# Patient Record
Sex: FEMALE | Race: WHITE | Employment: FULL TIME | URBAN - METROPOLITAN AREA
[De-identification: names, ages, dates, MRNs, and addresses within clinical notes are randomized per-mention and may not be internally consistent; named-entity substitution may affect disease eponyms.]

---

## 2021-03-19 ENCOUNTER — VIRTUAL VISIT (OUTPATIENT)
Dept: ENDOCRINOLOGY | Age: 26
End: 2021-03-19
Payer: COMMERCIAL

## 2021-03-19 DIAGNOSIS — E28.2 PCOS (POLYCYSTIC OVARIAN SYNDROME): Primary | ICD-10-CM

## 2021-03-19 DIAGNOSIS — L68.0 HIRSUTISM: ICD-10-CM

## 2021-03-19 PROCEDURE — 99204 OFFICE O/P NEW MOD 45 MIN: CPT | Performed by: INTERNAL MEDICINE

## 2021-03-19 RX ORDER — ARIPIPRAZOLE 10 MG/1
TABLET ORAL
COMMUNITY

## 2021-03-19 RX ORDER — METFORMIN HYDROCHLORIDE 500 MG/1
TABLET, EXTENDED RELEASE ORAL
Qty: 120 TAB | Refills: 11 | Status: SHIPPED | OUTPATIENT
Start: 2021-03-19 | End: 2021-09-22

## 2021-03-19 RX ORDER — LEVONORGESTREL 19.5 MG/1
1 INTRAUTERINE DEVICE INTRAUTERINE ONCE
COMMUNITY

## 2021-03-19 NOTE — PROGRESS NOTES
Chief Complaint   Patient presents with    New Patient     PCOS    Other     no pcp and pharmacy confirmed       **THIS IS A VIRTUAL VISIT VIA A VIDEO SYNCHRONOUS DISCUSSION. PATIENT AGREED TO HAVE THEIR CARE DELIVERED OVER A DOXY. ME VIDEO VISIT IN PLACE OF THEIR REGULARLY SCHEDULED OFFICE VISIT**    History of Present Illness: Lino Oliver (Janette) is a 32 y.o. female who is a new patient for evaluation of PCOS referred by Maritza Dunn. Prior to the birth of her son 2.5 years ago, had a rogue hair that she would deal with but since the birth of her son, this has worsened and now has to shave every other day and some on the abdomen. Always had irregular periods since menarche with periods every other month and then went on Nexplanon for about 3 years and didn't have any periods during this time. Then came off this and went back to periods every other month and then became pregnant without trying and had Nexplanon re-inserted and then had an IUD inserted in 8/20 and since then has had periods every month that sometimes can last weeks at a time. Did have an ultrasound in her teens and apparently one ovary wasn't seen well and the other didn't have cysts for sure. No repeat ultrasounds. She saw her GYN, Maritza Dunn, in 1/21 and had labs showing an elevated total testosterone of 73 and free testosterone of 5.3 and her DHEA-S was high at 748 and 17-OH progesterone was normal at 168 and was told she had PCOS and referred for this reason. Weight is currently 178 and was as high as 220 at the time of delivery. Has lost weight since delivery walking a 5K per day on her treadmill but hasn't made any change to her diet and does have room for improvement. Doesn't drink any sweet drinks but does put some milk in her coffee. Did not have gestational DM but did have gestational HTN and did not need any meds was delivered early vaginally by just 2 weeks.       Past Medical History:   Diagnosis Date    Bipolar disorder ([facility] 2015    Gestational hypertension     did not need any meds    SVT (supraventricular tachycardia) ([facility] 2008    s/p ablation     Past Surgical History:   Procedure Laterality Date    HX SVT ABLATION  2008    HX TONSILLECTOMY  2002    HX TYMPANOPLASTY       Current Outpatient Medications   Medication Sig    ARIPiprazole (Abilify) 10 mg tablet Abilify 10 mg tablet   Take 1 tablet every day by oral route.  levonorgestreL (Kyleena) 17.5 mcg/24 hrs (5 yrs) 19.5 mg IUD IUD 1 Each by IntraUTERine route once. Inserted in 8/20     No current facility-administered medications for this visit. No Known Allergies     History reviewed. No pertinent family history.      Social History     Socioeconomic History    Marital status: SINGLE     Spouse name: Not on file    Number of children: Not on file    Years of education: Not on file    Highest education level: Not on file   Occupational History    Not on file   Social Needs    Financial resource strain: Not on file    Food insecurity     Worry: Not on file     Inability: Not on file    Transportation needs     Medical: Not on file     Non-medical: Not on file   Tobacco Use    Smoking status: Former Smoker     Quit date: 2018     Years since quitting: 3.2    Smokeless tobacco: Never Used   Substance and Sexual Activity    Alcohol use: Yes     Comment: every other week may have 2-3 drinks    Drug use: Yes     Comment: marijuana    Sexual activity: Not on file   Lifestyle    Physical activity     Days per week: Not on file     Minutes per session: Not on file    Stress: Not on file   Relationships    Social connections     Talks on phone: Not on file     Gets together: Not on file     Attends Scientologist service: Not on file     Active member of club or organization: Not on file     Attends meetings of clubs or organizations: Not on file     Relationship status: Not on file    Intimate partner violence     Fear of current or ex partner: Not on file     Emotionally abused: Not on file     Physically abused: Not on file     Forced sexual activity: Not on file   Other Topics Concern    Not on file   Social History Narrative    Lives in [address] [place] [address] with her aunt and 35 year old son. In the process of moving to [place]. 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 Likes to read and watch movies, paint, and be on the water. Review of Systems: per HPI    Physical Examination:  - GENERAL: Charo Wilkins well nourished   - EYES: EOMI, non-icteric, no proptosis   - Ear/Nose/Throat: NCAT, no visible inflammation or masses   - CARDIOVASCULAR: no cyanosis, no visible JVD   - RESPIRATORY: respiratory effort normal without any distress or labored breathing   - MUSCULOSKELETAL: Normal ROM of neck and upper extremities observed   - SKIN: No rash on face  - NEUROLOGIC:  No facial asymmetry (Cranial nerve 7 motor function), No gaze palsy   - PSYCHIATRIC: Normal affect, Normal insight and judgement     Data Reviewed:   - labs as above    Assessment/Plan:   1. PCOS (polycystic ovarian syndrome): Prior to the birth of her son 2.5 years ago, had a rogue hair that she would deal with but since the birth of her son, this has worsened and now has to shave every other day and some on the abdomen. Always had irregular periods since menarche with periods every other month and then went on Nexplanon for about 3 years and didn't have any periods during this time. Then came off this and went back to periods every other month and then became pregnant without trying and had Nexplanon re-inserted and then had an IUD inserted in 8/20 and since then has had periods every month that sometimes can last weeks at a time. Did have an ultrasound in her teens and apparently one ovary wasn't seen well and the other didn't have cysts for sure. No repeat ultrasounds.   She saw her GYN, Maritza Dunn, in 1/21 and had labs showing an elevated total testosterone of 73 and free testosterone of 5.3 and her DHEA-S was high at 748 and 17-OH progesterone was normal at 168 and was told she had PCOS and referred for this reason. Discussed the physiology of PCOS and will start metformin at this time. - begin Metformin  mg at dinner and titrate to 2 tabs bid as tolerated   - check total testosterone and DHEA-S and cmp in 3 months and prior to next visit     2. Hirsutism: hopefully metformin will help but may need spironolactone +/- OCP to help      Patient Instructions   1) The testosterone level is elevated. I think you could have a condition called PCOS or polycystic ovarian syndrome where your body is resistant to insulin leading to high testosterone levels and this can cause irregular periods and weight gain and hair growth. It also increases the risk for diabetes and heart disease in the future. We will try to lower your testosterone with metformin to help treat this condition and prevent diabetes. 2) Begin Metformin  mg with your largest meal and take for 1-2 weeks. If no GI side effects (nausea, diarrhea, belly pain), go up to 1000 mg = 2 tabs daily for 1-2 weeks. If still doing well, go up to 3 tabs = 1500 mg for another 1-2 weeks and then finally 4 tabs = 2000 mg daily. You can split the dose to 2 tabs twice daily if you would like. If at any point you cannot tolerate the dose, go back to the highest dose that you can tolerate. This will be ready for  at the pharmacy today. 3) We will follow your testosterone and DHEA-S levels over time to see if they come down with treatment to let us know the metformin is working. 4) We may want to consider switching from the IUD to a birth control pill in the future as this can help with hair growth and regulation of periods. 5) Please come for a follow up visit on 9/22/21 at 11:10am in our [facility] office. The address is [facility] [address] in the [facility] ([address] [facility] ([place], [place])) 6) I put an order directly into the [employer] system to repeat your labs in 3 months and in the 1-2 weeks prior to your next visit so just ask for the order under my name and you will receive a courtesy reminder through [employer] to have these drawn. Follow-up and Dispositions    · Return 9/22/21 at 11:10am.             Copy sent to:  Gypsy Winslow NP (Nurse Practitioner)    Lab follow up: 6/26/21  Component      Latest Ref Rng & Units 6/24/2021 6/24/2021 6/24/2021          11:00 AM 11:00 AM 11:00 AM   Glucose      65 - 99 mg/dL 85     BUN      6 - 20 mg/dL 13     Creatinine      0.57 - 1.00 mg/dL 0.78     GFR est non-AA      >59 mL/min/1.73 105     GFR est AA      >59 mL/min/1.73 121     BUN/Creatinine ratio      9 - 23 17     Sodium      134 - 144 mmol/L 136     Potassium      3.5 - 5.2 mmol/L 4.7     Chloride      96 - 106 mmol/L 101     CO2      20 - 29 mmol/L 21     Calcium      8.7 - 10.2 mg/dL 9.9     Protein, total      6.0 - 8.5 g/dL 7.2     Albumin      3.9 - 5.0 g/dL 4.6     GLOBULIN, TOTAL      1.5 - 4.5 g/dL 2.6     A-G Ratio      1.2 - 2.2 1.8     Bilirubin, total      0.0 - 1.2 mg/dL 1.0     Alk. phosphatase      48 - 121 IU/L 71     AST      0 - 40 IU/L 13     ALT      0 - 32 IU/L 8     Testosterone, Serum (Total)      10.0 - 55.0 ng/dL   22.1   DHEA Sulfate      84.8 - 378.0 ug/dL  693.0 (H)      Sent her the following message through [employer]:  Your total testosterone has come down from 73 to 22 and goal is under 30 so the metformin has worked well to treat your PCOS. Your DHEA-S has only come down from 748 to 693 so this level is still quite elevated. Given you couldn't tolerate a full 4 tabs of metformin per day and had to go down to 3 tabs daily earlier this month, it may be difficult to get this level down to normal without additional treatment. I would recommend we start spironolactone which is a medication to block testosterone to see if this will further treat your PCOS. I sent 50 mg tabs to start at 1 tablet daily in the morning for 2 weeks and then increase to 2 tabs daily. This will be ready for  at the pharmacy today.  -------------------------------------------------------------------------------------------------------------------  BUN and creatinine are markers of kidney function. Your values are normal.  -------------------------------------------------------------------------------------------------------------------  ALT and AST are markers of liver function.   Your values are normal.  -------------------------------------------------------------------------------------------------------------------  Your electrolytes (sodium, potassium, and calcium) are all normal.  Your glucose (blood sugar) is normal.

## 2021-03-19 NOTE — PATIENT INSTRUCTIONS
1) The testosterone level is elevated. I think you could have a condition called PCOS or polycystic ovarian syndrome where your body is resistant to insulin leading to high testosterone levels and this can cause irregular periods and weight gain and hair growth. It also increases the risk for diabetes and heart disease in the future. We will try to lower your testosterone with metformin to help treat this condition and prevent diabetes. 2) Begin Metformin  mg with your largest meal and take for 1-2 weeks. If no GI side effects (nausea, diarrhea, belly pain), go up to 1000 mg = 2 tabs daily for 1-2 weeks. If still doing well, go up to 3 tabs = 1500 mg for another 1-2 weeks and then finally 4 tabs = 2000 mg daily. You can split the dose to 2 tabs twice daily if you would like. If at any point you cannot tolerate the dose, go back to the highest dose that you can tolerate. This will be ready for  at the pharmacy today. 3) We will follow your testosterone and DHEA-S levels over time to see if they come down with treatment to let us know the metformin is working. 4) We may want to consider switching from the IUD to a birth control pill in the future as this can help with hair growth and regulation of periods. 5) Please come for a follow up visit on 9/22/21 at 11:10am in our [address] [place] [address] office. The address is [address] [place] [address] in the [facility] ([address] [facility]) 6) I put an order directly into the [employer] system to repeat your labs in 3 months and in the 1-2 weeks prior to your next visit so just ask for the order under my name and you will receive a courtesy reminder through [employer] to have these drawn.

## 2021-06-19 DIAGNOSIS — L68.0 HIRSUTISM: ICD-10-CM

## 2021-06-19 DIAGNOSIS — E28.2 PCOS (POLYCYSTIC OVARIAN SYNDROME): ICD-10-CM

## 2021-06-25 LAB
ALBUMIN SERPL-MCNC: 4.6 G/DL (ref 3.9–5)
ALBUMIN/GLOB SERPL: 1.8 {RATIO} (ref 1.2–2.2)
ALP SERPL-CCNC: 71 IU/L (ref 48–121)
ALT SERPL-CCNC: 8 IU/L (ref 0–32)
AST SERPL-CCNC: 13 IU/L (ref 0–40)
BILIRUB SERPL-MCNC: 1 MG/DL (ref 0–1.2)
BUN SERPL-MCNC: 13 MG/DL (ref 6–20)
BUN/CREAT SERPL: 17 (ref 9–23)
CALCIUM SERPL-MCNC: 9.9 MG/DL (ref 8.7–10.2)
CHLORIDE SERPL-SCNC: 101 MMOL/L (ref 96–106)
CO2 SERPL-SCNC: 21 MMOL/L (ref 20–29)
CREAT SERPL-MCNC: 0.78 MG/DL (ref 0.57–1)
DHEA-S SERPL-MCNC: 693 UG/DL (ref 84.8–378)
GLOBULIN SER CALC-MCNC: 2.6 G/DL (ref 1.5–4.5)
GLUCOSE SERPL-MCNC: 85 MG/DL (ref 65–99)
POTASSIUM SERPL-SCNC: 4.7 MMOL/L (ref 3.5–5.2)
PROT SERPL-MCNC: 7.2 G/DL (ref 6–8.5)
SODIUM SERPL-SCNC: 136 MMOL/L (ref 134–144)
TESTOST SERPL-MCNC: 22.1 NG/DL (ref 10–55)

## 2021-06-26 RX ORDER — SPIRONOLACTONE 50 MG/1
TABLET, FILM COATED ORAL
Qty: 180 TABLET | Refills: 3 | Status: SHIPPED | OUTPATIENT
Start: 2021-06-26

## 2021-09-08 DIAGNOSIS — E28.2 PCOS (POLYCYSTIC OVARIAN SYNDROME): ICD-10-CM

## 2021-09-08 DIAGNOSIS — L68.0 HIRSUTISM: ICD-10-CM

## 2021-09-22 ENCOUNTER — OFFICE VISIT (OUTPATIENT)
Dept: ENDOCRINOLOGY | Age: 26
End: 2021-09-22
Payer: COMMERCIAL

## 2021-09-22 VITALS
WEIGHT: 164.8 LBS | SYSTOLIC BLOOD PRESSURE: 109 MMHG | DIASTOLIC BLOOD PRESSURE: 69 MMHG | BODY MASS INDEX: 25.87 KG/M2 | HEIGHT: 67 IN | HEART RATE: 75 BPM

## 2021-09-22 DIAGNOSIS — E28.2 PCOS (POLYCYSTIC OVARIAN SYNDROME): Primary | ICD-10-CM

## 2021-09-22 LAB
ALBUMIN SERPL-MCNC: 4.6 G/DL (ref 3.9–5)
ALBUMIN/GLOB SERPL: 2.3 {RATIO} (ref 1.2–2.2)
ALP SERPL-CCNC: 72 IU/L (ref 44–121)
ALT SERPL-CCNC: 9 IU/L (ref 0–32)
AST SERPL-CCNC: 10 IU/L (ref 0–40)
BILIRUB SERPL-MCNC: 0.3 MG/DL (ref 0–1.2)
BUN SERPL-MCNC: 19 MG/DL (ref 6–20)
BUN/CREAT SERPL: 24 (ref 9–23)
CALCIUM SERPL-MCNC: 9.3 MG/DL (ref 8.7–10.2)
CHLORIDE SERPL-SCNC: 104 MMOL/L (ref 96–106)
CO2 SERPL-SCNC: 22 MMOL/L (ref 20–29)
CREAT SERPL-MCNC: 0.78 MG/DL (ref 0.57–1)
DHEA-S SERPL-MCNC: 475 UG/DL (ref 84.8–378)
GLOBULIN SER CALC-MCNC: 2 G/DL (ref 1.5–4.5)
GLUCOSE SERPL-MCNC: 87 MG/DL (ref 65–99)
POTASSIUM SERPL-SCNC: 4.5 MMOL/L (ref 3.5–5.2)
PROT SERPL-MCNC: 6.6 G/DL (ref 6–8.5)
SODIUM SERPL-SCNC: 139 MMOL/L (ref 134–144)
TESTOST SERPL-MCNC: 30.4 NG/DL (ref 10–55)

## 2021-09-22 PROCEDURE — 99214 OFFICE O/P EST MOD 30 MIN: CPT | Performed by: INTERNAL MEDICINE

## 2021-09-22 RX ORDER — METFORMIN HYDROCHLORIDE 500 MG/1
TABLET, EXTENDED RELEASE ORAL
Qty: 120 TABLET | Refills: 11
Start: 2021-09-22

## 2021-09-22 RX ORDER — LAMOTRIGINE 25 MG/1
TABLET ORAL
COMMUNITY
Start: 2021-09-17

## 2021-09-22 RX ORDER — ONDANSETRON 4 MG/1
4 TABLET, ORALLY DISINTEGRATING ORAL
Qty: 30 TABLET | Refills: 11 | Status: SHIPPED | OUTPATIENT
Start: 2021-09-22

## 2021-09-22 NOTE — PROGRESS NOTES
Chief Complaint   Patient presents with    PCOS     no pcp and pharmacy confirmed     History of Present Illness: Mackenzie Baldwin (Janette) is a 32 y.o. female here for follow up of PCOS. Weight down 14 lbs since last visit in 3/21. Has been taking 2 tabs of metformin together in the morning and tried adding a 3rd tab at night a few months ago but had more n/v and stopped this and has just stayed on 2 tabs in the morning. Started kasia in 6/21 and has worked up to 2 tabs together in the morning. Has been doing pilates and calesthenics over the past 5 months and has cut back on sugar and fat. Most of her carbs are from fruit and veggies. Still has IUD in place. Periods are coming each month and had been bleeding for up to 3 weeks at a time prior to starting metformin but has been down to about 10 days per month over the past few months. Has cut back on need for shaving from every other day to about every 2 weeks. Hair has been not as dark but can still feel coarse. Her depression has been a little worse recently and just had lamictal prescribed and will be starting this now. Willing to try and go up on metformin but asked for some zofran to have in case she has n/v so I sent this in for her. Current Outpatient Medications   Medication Sig    lamoTRIgine (LaMICtal) 25 mg tablet Take 1 tab daily x 2 weeks and then increase to 2 tabs daily    metFORMIN ER (GLUCOPHAGE XR) 500 mg tablet Take 2 tabs daily--Dose change 9/22/21--updated med list--did not send prescription to the pharmacy    spironolactone (ALDACTONE) 50 mg tablet Take 1 tab daily for 2 weeks and then increase to 2 tabs daily    ARIPiprazole (Abilify) 10 mg tablet Abilify 10 mg tablet   Take 1 tablet every day by oral route.  levonorgestreL (Kyleena) 17.5 mcg/24 hrs (5 yrs) 19.5 mg IUD IUD 1 Each by IntraUTERine route once. Inserted in 8/20     No current facility-administered medications for this visit.      No Known Allergies     Review of Systems: PER HPI    Physical Examination:  Blood pressure 109/69, pulse 75, height 5' 7\" (1.702 m), weight 164 lb 12.8 oz (74.8 kg). - General: pleasant, no distress, good eye contact   - Neck: small amount of palpable thyroid tissue without nodules, no carotid bruits  - Cardiovascular: regular, normal rate, nl s1 and s2, no m/r/g   - Respiratory: clear to auscultation bilaterally   - Integumentary: skin is normal, no edema  - Neurological: reflexes 2+ at biceps, no tremors  - Psychiatric: normal mood and affect    Data Reviewed:   Component      Latest Ref Rng & Units 9/20/2021 9/20/2021 9/20/2021           8:37 AM  8:37 AM  8:37 AM   Glucose      65 - 99 mg/dL 87     BUN      6 - 20 mg/dL 19     Creatinine      0.57 - 1.00 mg/dL 0.78     GFR est non-AA      >59 mL/min/1.73 105     GFR est AA      >59 mL/min/1.73 121     BUN/Creatinine ratio      9 - 23 24 (H)     Sodium      134 - 144 mmol/L 139     Potassium      3.5 - 5.2 mmol/L 4.5     Chloride      96 - 106 mmol/L 104     CO2      20 - 29 mmol/L 22     Calcium      8.7 - 10.2 mg/dL 9.3     Protein, total      6.0 - 8.5 g/dL 6.6     Albumin      3.9 - 5.0 g/dL 4.6     GLOBULIN, TOTAL      1.5 - 4.5 g/dL 2.0     A-G Ratio      1.2 - 2.2 2.3 (H)     Bilirubin, total      0.0 - 1.2 mg/dL 0.3     Alk. phosphatase      44 - 121 IU/L 72     AST      0 - 40 IU/L 10     ALT      0 - 32 IU/L 9     DHEA Sulfate      84.8 - 378.0 ug/dL   475.0 (H)   Testosterone, Serum (Total)      10.0 - 55.0 ng/dL  30.4        Assessment/Plan:     1. PCOS (polycystic ovarian syndrome): Prior to the birth of her son 2.5 years ago, had a rogue hair that she would deal with but since the birth of her son, this has worsened and now has to shave every other day and some on the abdomen. Always had irregular periods since menarche with periods every other month and then went on Nexplanon for about 3 years and didn't have any periods during this time.   Then came off this and went back to periods every other month and then became pregnant without trying and had Nexplanon re-inserted and then had an IUD inserted in 8/20 and since then has had periods every month that sometimes can last weeks at a time. Did have an ultrasound in her teens and apparently one ovary wasn't seen well and the other didn't have cysts for sure. No repeat ultrasounds. She saw her GYN, Karen Agarwal, in 1/21 and had labs showing an elevated total testosterone of 73 and free testosterone of 5.3 and her DHEA-S was high at 748 and 17-OH progesterone was normal at 168 and was told she had PCOS and referred for this reason. Began metformin at initial visit in 3/21 and worked up to 2 tabs daily and T down to 22 and DHEA-S down to 693 in 6/21 and T 30.4 and DHEA-S 475 in 9/21. Will try to go up to 3 tabs/day. - increase Metformin  mg to 2 tabs in am and 1 tab at dinner as tolerated   - zofran 4 mg as needed for n/v  - check total testosterone and DHEA-S and bmp prior to next visit       2. Hirsutism: metformin and kasia have helped. - increase kasia 50 mg to 3 tabs daily as tolerated       Patient Instructions   1) Your testosterone is 30 and goal is 30 or less and this is down from 73 prior to starting treatment. 2) Start the lamictal first and work up to 2 tabs daily and if stable after 2 weeks on this dose of 2 tab per day, then try adding a 3rd pill of metformin per day by taking it at dinner and stay on 2 tabs in the morning. If needed, I sent some zofran for nausea to your pharmacy. 3) If tolerating 3 tabs of metformin for 2 weeks, then you can try increasing the spironolactone to 3 tabs daily and if tolerating this dose, then let me know and I can rewrite for 100 mg tabs to take 1.5 tabs daily. 4) BUN and creatinine are markers of kidney function. Your values are normal.    5) I hope your DHEA-S level will continue to come down.         Follow-up and Dispositions    · Return in about 6 months (around 3/22/2022).                Copy sent to:  Alma Delia Grant NP (Nurse Practitioner)

## 2021-09-22 NOTE — PATIENT INSTRUCTIONS
1) Your testosterone is 30 and goal is 30 or less and this is down from 73 prior to starting treatment. 2) Start the lamictal first and work up to 2 tabs daily and if stable after 2 weeks on this dose of 2 tab per day, then try adding a 3rd pill of metformin per day by taking it at dinner and stay on 2 tabs in the morning. If needed, I sent some zofran for nausea to your pharmacy. 3) If tolerating 3 tabs of metformin for 2 weeks, then you can try increasing the spironolactone to 3 tabs daily and if tolerating this dose, then let me know and I can rewrite for 100 mg tabs to take 1.5 tabs daily. 4) BUN and creatinine are markers of kidney function. Your values are normal.    5) I hope your DHEA-S level will continue to come down.

## 2021-12-04 ENCOUNTER — APPOINTMENT (OUTPATIENT)
Dept: ULTRASOUND IMAGING | Age: 26
End: 2021-12-04
Attending: EMERGENCY MEDICINE
Payer: COMMERCIAL

## 2021-12-04 ENCOUNTER — APPOINTMENT (OUTPATIENT)
Dept: CT IMAGING | Age: 26
End: 2021-12-04
Attending: EMERGENCY MEDICINE
Payer: COMMERCIAL

## 2021-12-04 ENCOUNTER — HOSPITAL ENCOUNTER (EMERGENCY)
Age: 26
Discharge: HOME | End: 2021-12-04
Attending: EMERGENCY MEDICINE
Payer: COMMERCIAL

## 2021-12-04 VITALS
SYSTOLIC BLOOD PRESSURE: 128 MMHG | HEART RATE: 99 BPM | WEIGHT: 164 LBS | TEMPERATURE: 98 F | BODY MASS INDEX: 25.74 KG/M2 | RESPIRATION RATE: 16 BRPM | DIASTOLIC BLOOD PRESSURE: 76 MMHG | HEIGHT: 67 IN | OXYGEN SATURATION: 99 %

## 2021-12-04 DIAGNOSIS — R10.9 RIGHT FLANK PAIN: Primary | ICD-10-CM

## 2021-12-04 DIAGNOSIS — R31.9 HEMATURIA, UNSPECIFIED TYPE: ICD-10-CM

## 2021-12-04 LAB
ALBUMIN SERPL-MCNC: 3.6 G/DL (ref 3.5–5)
ALBUMIN/GLOB SERPL: 0.8 {RATIO} (ref 1.1–2.2)
ALP SERPL-CCNC: 58 U/L (ref 45–117)
ALT SERPL-CCNC: 37 U/L (ref 12–78)
ANION GAP SERPL CALC-SCNC: 6 MMOL/L (ref 5–15)
APPEARANCE UR: ABNORMAL
AST SERPL-CCNC: 18 U/L (ref 15–37)
BACTERIA URNS QL MICRO: ABNORMAL /HPF
BASOPHILS # BLD: 0 K/UL (ref 0–0.1)
BASOPHILS NFR BLD: 0 % (ref 0–1)
BILIRUB SERPL-MCNC: 0.7 MG/DL (ref 0.2–1)
BILIRUB UR QL CFM: NEGATIVE
BUN SERPL-MCNC: 20 MG/DL (ref 6–20)
BUN/CREAT SERPL: 25 (ref 12–20)
CALCIUM SERPL-MCNC: 9.5 MG/DL (ref 8.5–10.1)
CHLORIDE SERPL-SCNC: 105 MMOL/L (ref 97–108)
CO2 SERPL-SCNC: 23 MMOL/L (ref 21–32)
COLOR UR: ABNORMAL
CREAT SERPL-MCNC: 0.8 MG/DL (ref 0.55–1.02)
DIFFERENTIAL METHOD BLD: ABNORMAL
EOSINOPHIL # BLD: 0.1 K/UL (ref 0–0.4)
EOSINOPHIL NFR BLD: 1 % (ref 0–7)
EPITH CASTS URNS QL MICRO: ABNORMAL /LPF
ERYTHROCYTE [DISTWIDTH] IN BLOOD BY AUTOMATED COUNT: 11.7 % (ref 11.5–14.5)
GLOBULIN SER CALC-MCNC: 4.6 G/DL (ref 2–4)
GLUCOSE SERPL-MCNC: 96 MG/DL (ref 65–100)
GLUCOSE UR STRIP.AUTO-MCNC: NEGATIVE MG/DL
HCG UR QL: NEGATIVE
HCT VFR BLD AUTO: 39 % (ref 35–47)
HGB BLD-MCNC: 13.1 G/DL (ref 11.5–16)
HGB UR QL STRIP: ABNORMAL
IMM GRANULOCYTES # BLD AUTO: 0 K/UL (ref 0–0.04)
IMM GRANULOCYTES NFR BLD AUTO: 0 % (ref 0–0.5)
KETONES UR QL STRIP.AUTO: ABNORMAL MG/DL
LEUKOCYTE ESTERASE UR QL STRIP.AUTO: ABNORMAL
LIPASE SERPL-CCNC: 80 U/L (ref 73–393)
LYMPHOCYTES # BLD: 1.4 K/UL (ref 0.8–3.5)
LYMPHOCYTES NFR BLD: 12 % (ref 12–49)
MCH RBC QN AUTO: 31.4 PG (ref 26–34)
MCHC RBC AUTO-ENTMCNC: 33.6 G/DL (ref 30–36.5)
MCV RBC AUTO: 93.5 FL (ref 80–99)
MONOCYTES # BLD: 0.7 K/UL (ref 0–1)
MONOCYTES NFR BLD: 6 % (ref 5–13)
NEUTS SEG # BLD: 10 K/UL (ref 1.8–8)
NEUTS SEG NFR BLD: 81 % (ref 32–75)
NITRITE UR QL STRIP.AUTO: NEGATIVE
NRBC # BLD: 0 K/UL (ref 0–0.01)
NRBC BLD-RTO: 0 PER 100 WBC
PH UR STRIP: 5.5 [PH] (ref 5–8)
PLATELET # BLD AUTO: 295 K/UL (ref 150–400)
PMV BLD AUTO: 10 FL (ref 8.9–12.9)
POTASSIUM SERPL-SCNC: 3.8 MMOL/L (ref 3.5–5.1)
PROT SERPL-MCNC: 8.2 G/DL (ref 6.4–8.2)
PROT UR STRIP-MCNC: 30 MG/DL
RBC # BLD AUTO: 4.17 M/UL (ref 3.8–5.2)
RBC #/AREA URNS HPF: ABNORMAL /HPF (ref 0–5)
SODIUM SERPL-SCNC: 134 MMOL/L (ref 136–145)
SP GR UR REFRACTOMETRY: 1.02 (ref 1–1.03)
UR CULT HOLD, URHOLD: NORMAL
UROBILINOGEN UR QL STRIP.AUTO: 1 EU/DL (ref 0.2–1)
WBC # BLD AUTO: 12.4 K/UL (ref 3.6–11)
WBC URNS QL MICRO: ABNORMAL /HPF (ref 0–4)

## 2021-12-04 PROCEDURE — 99283 EMERGENCY DEPT VISIT LOW MDM: CPT

## 2021-12-04 PROCEDURE — 81001 URINALYSIS AUTO W/SCOPE: CPT

## 2021-12-04 PROCEDURE — 96375 TX/PRO/DX INJ NEW DRUG ADDON: CPT

## 2021-12-04 PROCEDURE — 74011000250 HC RX REV CODE- 250: Performed by: EMERGENCY MEDICINE

## 2021-12-04 PROCEDURE — 85025 COMPLETE CBC W/AUTO DIFF WBC: CPT

## 2021-12-04 PROCEDURE — 74011000636 HC RX REV CODE- 636: Performed by: RADIOLOGY

## 2021-12-04 PROCEDURE — 81025 URINE PREGNANCY TEST: CPT

## 2021-12-04 PROCEDURE — 74177 CT ABD & PELVIS W/CONTRAST: CPT

## 2021-12-04 PROCEDURE — 96374 THER/PROPH/DIAG INJ IV PUSH: CPT

## 2021-12-04 PROCEDURE — 83690 ASSAY OF LIPASE: CPT

## 2021-12-04 PROCEDURE — 76830 TRANSVAGINAL US NON-OB: CPT

## 2021-12-04 PROCEDURE — 74011250636 HC RX REV CODE- 250/636: Performed by: EMERGENCY MEDICINE

## 2021-12-04 PROCEDURE — 36415 COLL VENOUS BLD VENIPUNCTURE: CPT

## 2021-12-04 PROCEDURE — 80053 COMPREHEN METABOLIC PANEL: CPT

## 2021-12-04 PROCEDURE — 76856 US EXAM PELVIC COMPLETE: CPT

## 2021-12-04 PROCEDURE — 87086 URINE CULTURE/COLONY COUNT: CPT

## 2021-12-04 RX ORDER — FENTANYL CITRATE 50 UG/ML
50 INJECTION, SOLUTION INTRAMUSCULAR; INTRAVENOUS ONCE
Status: COMPLETED | OUTPATIENT
Start: 2021-12-04 | End: 2021-12-04

## 2021-12-04 RX ORDER — ONDANSETRON 2 MG/ML
4 INJECTION INTRAMUSCULAR; INTRAVENOUS ONCE
Status: COMPLETED | OUTPATIENT
Start: 2021-12-04 | End: 2021-12-04

## 2021-12-04 RX ORDER — KETOROLAC TROMETHAMINE 30 MG/ML
INJECTION, SOLUTION INTRAMUSCULAR; INTRAVENOUS
Status: DISCONTINUED
Start: 2021-12-04 | End: 2021-12-04 | Stop reason: HOSPADM

## 2021-12-04 RX ORDER — ONDANSETRON 4 MG/1
4 TABLET, ORALLY DISINTEGRATING ORAL
Qty: 12 TABLET | Refills: 0 | Status: SHIPPED | OUTPATIENT
Start: 2021-12-04

## 2021-12-04 RX ORDER — KETOROLAC TROMETHAMINE 30 MG/ML
30 INJECTION, SOLUTION INTRAMUSCULAR; INTRAVENOUS
Status: COMPLETED | OUTPATIENT
Start: 2021-12-04 | End: 2021-12-04

## 2021-12-04 RX ORDER — CEPHALEXIN 500 MG/1
500 CAPSULE ORAL 2 TIMES DAILY
Qty: 14 CAPSULE | Refills: 0 | Status: SHIPPED | OUTPATIENT
Start: 2021-12-04 | End: 2021-12-11

## 2021-12-04 RX ADMIN — ONDANSETRON 4 MG: 2 INJECTION INTRAMUSCULAR; INTRAVENOUS at 12:35

## 2021-12-04 RX ADMIN — IOPAMIDOL 100 ML: 755 INJECTION, SOLUTION INTRAVENOUS at 13:49

## 2021-12-04 RX ADMIN — CEFTRIAXONE 1 G: 1 INJECTION, POWDER, FOR SOLUTION INTRAMUSCULAR; INTRAVENOUS at 16:32

## 2021-12-04 RX ADMIN — KETOROLAC TROMETHAMINE 30 MG: 30 INJECTION, SOLUTION INTRAMUSCULAR at 12:35

## 2021-12-04 RX ADMIN — FENTANYL CITRATE 50 MCG: 50 INJECTION, SOLUTION INTRAMUSCULAR; INTRAVENOUS at 14:04

## 2021-12-04 RX ADMIN — SODIUM CHLORIDE 1000 ML: 9 INJECTION, SOLUTION INTRAVENOUS at 12:26

## 2021-12-04 NOTE — ED PROVIDER NOTES
Date of Service:  12/4/21    Patient:  Sweta Campbell    Chief Complaint:  Abdominal Pain and Nausea       HPI:  Sweta Campbell is a 32 y.o.  female who presents for evaluation of abdominal pain. Patient started with right lower quadrant abdominal pain yesterday. Patient describes an 8 out of 10 pain with radiation to the right flank that started this morning. 10 out of 10 with palpation or movement. Nausea vomiting, decreased appetite. Subjective fevers but no fever here. No antipyretics. Patient took Zofran at 3 AM.  Patient denies vaginal complaints or abnormal vaginal bleeding. She does have PCOS and takes 2 types of birth control, denies chance of pregnancy. Otherwise no chest pain or other acute complaints or modifying factors. Past Medical History:   Diagnosis Date    Bipolar disorder ([facility] 2015    Gestational hypertension     did not need any meds    PCOS (polycystic ovarian syndrome)     [facility]    s/p ablation       Past Surgical History:   Procedure Laterality Date    HX SVT ABLATION  2008    HX TONSILLECTOMY  2002    HX TYMPANOPLASTY           No family history on file. Social History     Socioeconomic History    Marital status: SINGLE     Spouse name: Not on file    Number of children: Not on file    Years of education: Not on file    Highest education level: Not on file   Occupational History    Not on file   Tobacco Use    Smoking status: Current Some Day Smoker     Last attempt to quit: 2018     Years since quitting: 3.9    Smokeless tobacco: Never Used   Substance and Sexual Activity    Alcohol use: Yes     Comment: every other week may have 2-3 drinks    Drug use: Yes     Comment: marijuana    Sexual activity: Not on file   Other Topics Concern    Not on file   Social History Narrative    Lives in [address] [place] [address] with her aunt and 35 year old son. In the process of moving to [place].   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 Likes to read and watch movies, paint, and be on the water. Social Determinants of Health     Financial Resource Strain:     Difficulty of Paying Living Expenses: Not on file   Food Insecurity:     Worried About Running Out of Food in the Last Year: Not on file    Marvin of Food in the Last Year: Not on file   Transportation Needs:     Lack of Transportation (Medical): Not on file    Lack of Transportation (Non-Medical): Not on file   Physical Activity:     Days of Exercise per Week: Not on file    Minutes of Exercise per Session: Not on file   Stress:     Feeling of Stress : Not on file   Social Connections:     Frequency of Communication with Friends and Family: Not on file    Frequency of Social Gatherings with Friends and Family: Not on file    Attends Hinduism Services: Not on file    Active Member of [address] [place] [address] [employer] or Organizations: Not on file    Attends Club or Organization Meetings: Not on file    Marital Status: Not on file   Intimate Partner Violence:     Fear of Current or Ex-Partner: Not on file    Emotionally Abused: Not on file    Physically Abused: Not on file    Sexually Abused: Not on file   Housing Stability:     Unable to Pay for Housing in the Last Year: Not on file    Number of [place] in the Last Year: Not on file    Unstable Housing in the Last Year: Not on file         ALLERGIES: Patient has no known allergies. Review of Systems   Constitutional: Positive for appetite change. Negative for fever. HENT: Negative for hearing loss. Eyes: Negative for visual disturbance. Respiratory: Negative for shortness of breath. Cardiovascular: Negative for chest pain. Gastrointestinal: Positive for abdominal pain, nausea and vomiting. Genitourinary: Positive for flank pain. Musculoskeletal: Negative for back pain. Skin: Negative for rash. Neurological: Negative for dizziness and light-headedness. Psychiatric/Behavioral: Negative for confusion. There were no vitals filed for this visit. Physical Exam  Vitals and nursing note reviewed. Constitutional:       Appearance: She is well-developed. HENT:      Head: Normocephalic and atraumatic. Mouth/Throat:      Mouth: Mucous membranes are moist.   Eyes:      General: No scleral icterus. Cardiovascular:      Rate and Rhythm: Normal rate. Pulmonary:      Effort: Pulmonary effort is normal.   Abdominal:      General: Abdomen is flat. Tenderness: There is abdominal tenderness in the right lower quadrant. There is right CVA tenderness. There is no left CVA tenderness. Positive signs include McBurney's sign. Musculoskeletal:         General: Normal range of motion. Skin:     General: Skin is warm. Neurological:      Mental Status: She is alert and oriented to person, place, and time. Psychiatric:         Mood and Affect: Mood normal.          [facility]  ED Course as of 12/04/21 1911   Sat Dec 04, 2021   1245 EKG 1245  Normal sinus rhythm at 69 bpm.  Sinus arrhythmia. Normal axis. Normal intervals. No STEMI [GG]   1348 Blood(!): MODERATE [GG]      ED Course User Index  [GG] Niki Serum, DO     . VITAL SIGNS:  Patient Vitals for the past 4 hrs:   Pulse Resp BP SpO2   12/04/21 1518 99 16 128/76 99 %         LABS:  No results found for this or any previous visit (from the past 6 hour(s)). IMAGING:  US TRANSVAGINAL   Final Result      1. Normal pelvic ultrasound. 2. IUD in the uterus. US PELV NON OBS   Final Result      1. Normal pelvic ultrasound. 2. IUD in the uterus. CT ABD PELV W CONT   Final Result   No acute abdominal or pelvic pathology.             Medications During Visit:  Medications   ketorolac (TORADOL) 30 mg/mL (1 mL) injection (has no administration in time range)   ondansetron (ZOFRAN) injection 4 mg (4 mg IntraVENous Given 12/4/21 1235)   sodium chloride 0.9 % bolus infusion 1,000 mL (0 mL IntraVENous IV Completed 12/4/21 1518)   ketorolac (TORADOL) injection 30 mg (30 mg IntraVENous Given 12/4/21 1235)   fentaNYL citrate (PF) injection 50 mcg (50 mcg IntraVENous Given 12/4/21 1404)   iopamidoL (ISOVUE-370) 76 % injection 100 mL (100 mL IntraVENous Given 12/4/21 1349)   cefTRIAXone (ROCEPHIN) 1 g in sterile water (preservative free) 10 mL IV syringe (1 g IntraVENous Given 12/4/21 1632)         DECISION MAKING:  Keyla Lopez is a 32 y.o. female who comes in as above. Here, patient has discomfort upon arrival that is well controlled with pain medicine here. Imaging else to show any source of pain. Possible UTI. Will treat for that and have the patient follow with her PCP. Return instructions discussed. I did discuss STD testing, patient has no symptoms and denies concern for STD. IMPRESSION:  1. Right flank pain    2. Hematuria, unspecified type        DISPOSITION:  Discharged      Discharge Medication List as of 12/4/2021  4:17 PM      START taking these medications    Details   cephALEXin (Keflex) 500 mg capsule Take 1 Capsule by mouth two (2) times a day for 7 days. , Normal, Disp-14 Capsule, R-0      !! ondansetron (ZOFRAN ODT) 4 mg disintegrating tablet Take 1 Tablet by mouth every eight (8) hours as needed for Nausea for up to 12 doses. , Normal, Disp-12 Tablet, R-0       !! - Potential duplicate medications found. Please discuss with provider.       CONTINUE these medications which have NOT CHANGED    Details   lamoTRIgine (LaMICtal) 25 mg tablet Take 1 tab daily x 2 weeks and then increase to 2 tabs daily, Historical Med      metFORMIN ER (GLUCOPHAGE XR) 500 mg tablet Take 2 tabs daily--Dose change 9/22/21--updated med list--did not send prescription to the pharmacy, No Print, Disp-120 Tablet, R-11      !! ondansetron (ZOFRAN ODT) 4 mg disintegrating tablet Take 1 Tablet by mouth every eight (8) hours as needed for Nausea or Vomiting., Normal, Disp-30 Tablet, R-11      spironolactone (ALDACTONE) 50 mg tablet Take 1 tab daily for 2 weeks and then increase to 2 tabs daily, Normal, Disp-180 Tablet, R-3      ARIPiprazole (Abilify) 10 mg tablet Abilify 10 mg tablet   Take 1 tablet every day by oral route., Historical Med      levonorgestreL (Kyleena) 17.5 mcg/24 hrs (5 yrs) 19.5 mg IUD IUD 1 Each by IntraUTERine route once. Inserted in 8/20, Historical Med       !! - Potential duplicate medications found. Please discuss with provider. Follow-up Information     Follow up With Specialties Details Why Contact Info    Iggy Snadra [address] [place] [id number] [phone]              The patient is asked to follow-up with their primary care provider in the next several days. They are to call tomorrow for an appointment. The patient is asked to return promptly for any increased concerns or worsening of symptoms. They can return to this emergency department or any other emergency department.     Procedures

## 2021-12-04 NOTE — ED TRIAGE NOTES
Pt arrives to ED with complaints of nausea and vomiting that started yesterday. Pt also notes RLQ pain that radiates around to right flank. Pt denies hx of kidney stones. Pt denies hx of abd surgeries.

## 2021-12-06 LAB
BACTERIA SPEC CULT: NORMAL
CC UR VC: NORMAL
SERVICE CMNT-IMP: NORMAL

## 2022-03-09 DIAGNOSIS — E28.2 PCOS (POLYCYSTIC OVARIAN SYNDROME): ICD-10-CM
